# Patient Record
Sex: FEMALE | Race: WHITE | Employment: OTHER | URBAN - METROPOLITAN AREA
[De-identification: names, ages, dates, MRNs, and addresses within clinical notes are randomized per-mention and may not be internally consistent; named-entity substitution may affect disease eponyms.]

---

## 2022-03-06 ENCOUNTER — HOSPITAL ENCOUNTER (INPATIENT)
Age: 87
LOS: 2 days | Discharge: HOME | DRG: 315 | End: 2022-03-09
Attending: FAMILY MEDICINE | Admitting: FAMILY MEDICINE
Payer: MEDICARE

## 2022-03-06 DIAGNOSIS — R09.02 HYPOXIA: ICD-10-CM

## 2022-03-06 DIAGNOSIS — N39.0 URINARY TRACT INFECTION WITHOUT HEMATURIA, SITE UNSPECIFIED: ICD-10-CM

## 2022-03-06 DIAGNOSIS — A41.9 SEPTICEMIA (HCC): Primary | ICD-10-CM

## 2022-03-06 DIAGNOSIS — I31.39 PERICARDIAL EFFUSION: ICD-10-CM

## 2022-03-06 DIAGNOSIS — R77.8 ELEVATED TROPONIN: ICD-10-CM

## 2022-03-06 DIAGNOSIS — R79.89 ELEVATED BRAIN NATRIURETIC PEPTIDE (BNP) LEVEL: ICD-10-CM

## 2022-03-06 PROCEDURE — 83690 ASSAY OF LIPASE: CPT

## 2022-03-06 PROCEDURE — 83880 ASSAY OF NATRIURETIC PEPTIDE: CPT

## 2022-03-06 PROCEDURE — 84484 ASSAY OF TROPONIN QUANT: CPT

## 2022-03-06 PROCEDURE — 80053 COMPREHEN METABOLIC PANEL: CPT

## 2022-03-06 PROCEDURE — 85730 THROMBOPLASTIN TIME PARTIAL: CPT

## 2022-03-06 PROCEDURE — 83605 ASSAY OF LACTIC ACID: CPT

## 2022-03-06 PROCEDURE — 85610 PROTHROMBIN TIME: CPT

## 2022-03-06 PROCEDURE — 85025 COMPLETE CBC W/AUTO DIFF WBC: CPT

## 2022-03-06 PROCEDURE — 99285 EMERGENCY DEPT VISIT HI MDM: CPT

## 2022-03-06 PROCEDURE — 87040 BLOOD CULTURE FOR BACTERIA: CPT

## 2022-03-06 RX ORDER — ANASTROZOLE 1 MG/1
1 TABLET ORAL DAILY
COMMUNITY

## 2022-03-06 RX ORDER — FUROSEMIDE 20 MG/1
20 TABLET ORAL DAILY
COMMUNITY

## 2022-03-06 RX ORDER — LOSARTAN POTASSIUM 25 MG/1
25 TABLET ORAL DAILY
Status: ON HOLD | COMMUNITY
End: 2022-03-09 | Stop reason: HOSPADM

## 2022-03-06 RX ORDER — AMLODIPINE BESYLATE 5 MG/1
5 TABLET ORAL DAILY
COMMUNITY

## 2022-03-06 RX ORDER — CLOPIDOGREL BISULFATE 75 MG/1
75 TABLET ORAL DAILY
COMMUNITY

## 2022-03-06 RX ORDER — POTASSIUM CHLORIDE 1.5 G/1.77G
20 POWDER, FOR SOLUTION ORAL DAILY
COMMUNITY

## 2022-03-06 RX ORDER — LETROZOLE 2.5 MG/1
2.5 TABLET, FILM COATED ORAL DAILY
COMMUNITY

## 2022-03-06 RX ORDER — OMEPRAZOLE 20 MG/1
20 CAPSULE, DELAYED RELEASE ORAL DAILY
Status: ON HOLD | COMMUNITY
End: 2022-03-09 | Stop reason: HOSPADM

## 2022-03-06 RX ORDER — TRAZODONE HYDROCHLORIDE 50 MG/1
50 TABLET ORAL NIGHTLY
COMMUNITY

## 2022-03-06 RX ORDER — CYCLOBENZAPRINE HCL 5 MG
5 TABLET ORAL AS NEEDED
COMMUNITY

## 2022-03-06 RX ORDER — ATORVASTATIN CALCIUM 40 MG/1
40 TABLET, FILM COATED ORAL DAILY
COMMUNITY

## 2022-03-06 RX ORDER — PANTOPRAZOLE SODIUM 40 MG/1
40 GRANULE, DELAYED RELEASE ORAL
COMMUNITY

## 2022-03-06 RX ORDER — COLCHICINE 0.6 MG/1
0.6 TABLET ORAL AS NEEDED
COMMUNITY

## 2022-03-06 RX ORDER — MIRTAZAPINE 15 MG/1
15 TABLET, FILM COATED ORAL NIGHTLY
Status: ON HOLD | COMMUNITY
End: 2022-03-09 | Stop reason: HOSPADM

## 2022-03-06 NOTE — Clinical Note
Discharge Plan[de-identified] Other/[facility])   Telemetry/Cardiac Monitoring Required?: Yes

## 2022-03-07 ENCOUNTER — APPOINTMENT (OUTPATIENT)
Dept: CT IMAGING | Age: 87
DRG: 315 | End: 2022-03-07
Payer: MEDICARE

## 2022-03-07 ENCOUNTER — APPOINTMENT (OUTPATIENT)
Dept: GENERAL RADIOLOGY | Age: 87
DRG: 315 | End: 2022-03-07
Payer: MEDICARE

## 2022-03-07 PROBLEM — I31.39 PERICARDIAL EFFUSION: Status: ACTIVE | Noted: 2022-03-07

## 2022-03-07 LAB
ALBUMIN SERPL-MCNC: 3.7 GM/DL (ref 3.4–5)
ALP BLD-CCNC: 122 IU/L (ref 40–129)
ALT SERPL-CCNC: 10 U/L (ref 10–40)
ANION GAP SERPL CALCULATED.3IONS-SCNC: 13 MMOL/L (ref 4–16)
APTT: 39.4 SECONDS (ref 25.1–37.1)
AST SERPL-CCNC: 20 IU/L (ref 15–37)
BACTERIA: NEGATIVE /HPF
BASOPHILS ABSOLUTE: 0 K/CU MM
BASOPHILS RELATIVE PERCENT: 0.2 % (ref 0–1)
BILIRUB SERPL-MCNC: 0.6 MG/DL (ref 0–1)
BILIRUBIN URINE: NEGATIVE MG/DL
BLOOD, URINE: ABNORMAL
BUN BLDV-MCNC: 20 MG/DL (ref 6–23)
CALCIUM SERPL-MCNC: 8.9 MG/DL (ref 8.3–10.6)
CHLORIDE BLD-SCNC: 107 MMOL/L (ref 99–110)
CLARITY: ABNORMAL
CO2: 19 MMOL/L (ref 21–32)
COLOR: YELLOW
CREAT SERPL-MCNC: 1.5 MG/DL (ref 0.6–1.1)
DIFFERENTIAL TYPE: ABNORMAL
EOSINOPHILS ABSOLUTE: 0 K/CU MM
EOSINOPHILS RELATIVE PERCENT: 0.1 % (ref 0–3)
GFR AFRICAN AMERICAN: 40 ML/MIN/1.73M2
GFR NON-AFRICAN AMERICAN: 33 ML/MIN/1.73M2
GLUCOSE BLD-MCNC: 129 MG/DL (ref 70–99)
GLUCOSE, URINE: NEGATIVE MG/DL
HCT VFR BLD CALC: 30.7 % (ref 37–47)
HEMOGLOBIN: 8.6 GM/DL (ref 12.5–16)
IMMATURE NEUTROPHIL %: 0.9 % (ref 0–0.43)
INR BLD: 1.4 INDEX
KETONES, URINE: NEGATIVE MG/DL
LACTATE: 1.6 MMOL/L (ref 0.4–2)
LEUKOCYTE ESTERASE, URINE: ABNORMAL
LIPASE: 29 IU/L (ref 13–60)
LV EF: 40 %
LVEF MODALITY: NORMAL
LYMPHOCYTES ABSOLUTE: 0.6 K/CU MM
LYMPHOCYTES RELATIVE PERCENT: 3.5 % (ref 24–44)
MCH RBC QN AUTO: 25.6 PG (ref 27–31)
MCHC RBC AUTO-ENTMCNC: 28 % (ref 32–36)
MCV RBC AUTO: 91.4 FL (ref 78–100)
MONOCYTES ABSOLUTE: 1.1 K/CU MM
MONOCYTES RELATIVE PERCENT: 7.1 % (ref 0–4)
MUCUS: ABNORMAL HPF
NITRITE URINE, QUANTITATIVE: POSITIVE
NUCLEATED RBC %: 0.3 %
PDW BLD-RTO: 15.9 % (ref 11.7–14.9)
PH, URINE: 6 (ref 5–8)
PLATELET # BLD: 208 K/CU MM (ref 140–440)
PMV BLD AUTO: 9.2 FL (ref 7.5–11.1)
POTASSIUM SERPL-SCNC: 4.3 MMOL/L (ref 3.5–5.1)
PRO-BNP: 9912 PG/ML
PROTEIN UA: 100 MG/DL
PROTHROMBIN TIME: 18.1 SECONDS (ref 11.7–14.5)
RBC # BLD: 3.36 M/CU MM (ref 4.2–5.4)
RBC URINE: 56 /HPF (ref 0–6)
SARS-COV-2, NAAT: NOT DETECTED
SEGMENTED NEUTROPHILS ABSOLUTE COUNT: 14 K/CU MM
SEGMENTED NEUTROPHILS RELATIVE PERCENT: 88.2 % (ref 36–66)
SODIUM BLD-SCNC: 139 MMOL/L (ref 135–145)
SOURCE: NORMAL
SPECIFIC GRAVITY UA: 1.02 (ref 1–1.03)
TOTAL IMMATURE NEUTOROPHIL: 0.15 K/CU MM
TOTAL NUCLEATED RBC: 0 K/CU MM
TOTAL PROTEIN: 6.5 GM/DL (ref 6.4–8.2)
TROPONIN T: 0.03 NG/ML
TROPONIN T: 0.04 NG/ML
UROBILINOGEN, URINE: 0.2 MG/DL (ref 0.2–1)
WBC # BLD: 15.9 K/CU MM (ref 4–10.5)
WBC CLUMP: ABNORMAL /HPF
WBC UA: 1135 /HPF (ref 0–5)

## 2022-03-07 PROCEDURE — 93306 TTE W/DOPPLER COMPLETE: CPT

## 2022-03-07 PROCEDURE — 82805 BLOOD GASES W/O2 SATURATION: CPT

## 2022-03-07 PROCEDURE — 86900 BLOOD TYPING SEROLOGIC ABO: CPT

## 2022-03-07 PROCEDURE — 86850 RBC ANTIBODY SCREEN: CPT

## 2022-03-07 PROCEDURE — 36415 COLL VENOUS BLD VENIPUNCTURE: CPT

## 2022-03-07 PROCEDURE — 96375 TX/PRO/DX INJ NEW DRUG ADDON: CPT

## 2022-03-07 PROCEDURE — 2580000003 HC RX 258: Performed by: PHYSICIAN ASSISTANT

## 2022-03-07 PROCEDURE — 6370000000 HC RX 637 (ALT 250 FOR IP): Performed by: PHYSICIAN ASSISTANT

## 2022-03-07 PROCEDURE — 86870 RBC ANTIBODY IDENTIFICATION: CPT

## 2022-03-07 PROCEDURE — 99223 1ST HOSP IP/OBS HIGH 75: CPT | Performed by: INTERNAL MEDICINE

## 2022-03-07 PROCEDURE — 93005 ELECTROCARDIOGRAM TRACING: CPT | Performed by: PHYSICIAN ASSISTANT

## 2022-03-07 PROCEDURE — 6360000002 HC RX W HCPCS: Performed by: PHYSICIAN ASSISTANT

## 2022-03-07 PROCEDURE — 1200000000 HC SEMI PRIVATE

## 2022-03-07 PROCEDURE — 84484 ASSAY OF TROPONIN QUANT: CPT

## 2022-03-07 PROCEDURE — 71250 CT THORAX DX C-: CPT

## 2022-03-07 PROCEDURE — 6370000000 HC RX 637 (ALT 250 FOR IP): Performed by: FAMILY MEDICINE

## 2022-03-07 PROCEDURE — 94761 N-INVAS EAR/PLS OXIMETRY MLT: CPT

## 2022-03-07 PROCEDURE — 87635 SARS-COV-2 COVID-19 AMP PRB: CPT

## 2022-03-07 PROCEDURE — 87150 DNA/RNA AMPLIFIED PROBE: CPT

## 2022-03-07 PROCEDURE — 96365 THER/PROPH/DIAG IV INF INIT: CPT

## 2022-03-07 PROCEDURE — 87086 URINE CULTURE/COLONY COUNT: CPT

## 2022-03-07 PROCEDURE — 81001 URINALYSIS AUTO W/SCOPE: CPT

## 2022-03-07 PROCEDURE — 96366 THER/PROPH/DIAG IV INF ADDON: CPT

## 2022-03-07 PROCEDURE — 2580000003 HC RX 258: Performed by: FAMILY MEDICINE

## 2022-03-07 PROCEDURE — 96367 TX/PROPH/DG ADDL SEQ IV INF: CPT

## 2022-03-07 PROCEDURE — 2580000003 HC RX 258: Performed by: STUDENT IN AN ORGANIZED HEALTH CARE EDUCATION/TRAINING PROGRAM

## 2022-03-07 PROCEDURE — 87186 SC STD MICRODIL/AGAR DIL: CPT

## 2022-03-07 PROCEDURE — 2700000000 HC OXYGEN THERAPY PER DAY

## 2022-03-07 PROCEDURE — 87088 URINE BACTERIA CULTURE: CPT

## 2022-03-07 PROCEDURE — 86901 BLOOD TYPING SEROLOGIC RH(D): CPT

## 2022-03-07 PROCEDURE — 86922 COMPATIBILITY TEST ANTIGLOB: CPT

## 2022-03-07 PROCEDURE — 74176 CT ABD & PELVIS W/O CONTRAST: CPT

## 2022-03-07 PROCEDURE — 71045 X-RAY EXAM CHEST 1 VIEW: CPT

## 2022-03-07 PROCEDURE — 87040 BLOOD CULTURE FOR BACTERIA: CPT

## 2022-03-07 RX ORDER — MIRTAZAPINE 15 MG/1
15 TABLET, FILM COATED ORAL NIGHTLY
Status: DISCONTINUED | OUTPATIENT
Start: 2022-03-07 | End: 2022-03-09 | Stop reason: HOSPADM

## 2022-03-07 RX ORDER — ONDANSETRON 2 MG/ML
4 INJECTION INTRAMUSCULAR; INTRAVENOUS EVERY 30 MIN PRN
Status: DISCONTINUED | OUTPATIENT
Start: 2022-03-07 | End: 2022-03-07

## 2022-03-07 RX ORDER — SODIUM CHLORIDE 9 MG/ML
INJECTION, SOLUTION INTRAVENOUS CONTINUOUS
Status: DISCONTINUED | OUTPATIENT
Start: 2022-03-07 | End: 2022-03-07

## 2022-03-07 RX ORDER — POLYETHYLENE GLYCOL 3350 17 G/17G
17 POWDER, FOR SOLUTION ORAL DAILY PRN
Status: DISCONTINUED | OUTPATIENT
Start: 2022-03-07 | End: 2022-03-09 | Stop reason: HOSPADM

## 2022-03-07 RX ORDER — SODIUM CHLORIDE 0.9 % (FLUSH) 0.9 %
5-40 SYRINGE (ML) INJECTION EVERY 12 HOURS SCHEDULED
Status: DISCONTINUED | OUTPATIENT
Start: 2022-03-07 | End: 2022-03-09 | Stop reason: HOSPADM

## 2022-03-07 RX ORDER — 0.9 % SODIUM CHLORIDE 0.9 %
1000 INTRAVENOUS SOLUTION INTRAVENOUS ONCE
Status: COMPLETED | OUTPATIENT
Start: 2022-03-07 | End: 2022-03-07

## 2022-03-07 RX ORDER — PROMETHAZINE HYDROCHLORIDE 25 MG/1
12.5 TABLET ORAL EVERY 6 HOURS PRN
Status: DISCONTINUED | OUTPATIENT
Start: 2022-03-07 | End: 2022-03-09 | Stop reason: HOSPADM

## 2022-03-07 RX ORDER — SODIUM CHLORIDE 9 MG/ML
25 INJECTION, SOLUTION INTRAVENOUS PRN
Status: DISCONTINUED | OUTPATIENT
Start: 2022-03-07 | End: 2022-03-09 | Stop reason: HOSPADM

## 2022-03-07 RX ORDER — TRAZODONE HYDROCHLORIDE 50 MG/1
50 TABLET ORAL NIGHTLY PRN
Status: DISCONTINUED | OUTPATIENT
Start: 2022-03-07 | End: 2022-03-09 | Stop reason: HOSPADM

## 2022-03-07 RX ORDER — FENTANYL CITRATE 50 UG/ML
25 INJECTION, SOLUTION INTRAMUSCULAR; INTRAVENOUS ONCE
Status: COMPLETED | OUTPATIENT
Start: 2022-03-07 | End: 2022-03-07

## 2022-03-07 RX ORDER — PANTOPRAZOLE SODIUM 40 MG/1
40 TABLET, DELAYED RELEASE ORAL
Status: DISCONTINUED | OUTPATIENT
Start: 2022-03-07 | End: 2022-03-09 | Stop reason: HOSPADM

## 2022-03-07 RX ORDER — LETROZOLE 2.5 MG/1
2.5 TABLET, FILM COATED ORAL DAILY
Status: DISCONTINUED | OUTPATIENT
Start: 2022-03-07 | End: 2022-03-09 | Stop reason: HOSPADM

## 2022-03-07 RX ORDER — SODIUM CHLORIDE 9 MG/ML
INJECTION, SOLUTION INTRAVENOUS CONTINUOUS
Status: DISPENSED | OUTPATIENT
Start: 2022-03-07 | End: 2022-03-07

## 2022-03-07 RX ORDER — 0.9 % SODIUM CHLORIDE 0.9 %
500 INTRAVENOUS SOLUTION INTRAVENOUS ONCE
Status: COMPLETED | OUTPATIENT
Start: 2022-03-07 | End: 2022-03-07

## 2022-03-07 RX ORDER — ACETAMINOPHEN 650 MG/1
650 SUPPOSITORY RECTAL EVERY 6 HOURS PRN
Status: DISCONTINUED | OUTPATIENT
Start: 2022-03-07 | End: 2022-03-09 | Stop reason: HOSPADM

## 2022-03-07 RX ORDER — ACETAMINOPHEN 500 MG
1000 TABLET ORAL ONCE
Status: COMPLETED | OUTPATIENT
Start: 2022-03-07 | End: 2022-03-07

## 2022-03-07 RX ORDER — SODIUM CHLORIDE 0.9 % (FLUSH) 0.9 %
5-40 SYRINGE (ML) INJECTION PRN
Status: DISCONTINUED | OUTPATIENT
Start: 2022-03-07 | End: 2022-03-09 | Stop reason: HOSPADM

## 2022-03-07 RX ORDER — ATORVASTATIN CALCIUM 40 MG/1
40 TABLET, FILM COATED ORAL NIGHTLY
Status: DISCONTINUED | OUTPATIENT
Start: 2022-03-07 | End: 2022-03-09 | Stop reason: HOSPADM

## 2022-03-07 RX ORDER — ACETAMINOPHEN 325 MG/1
650 TABLET ORAL EVERY 6 HOURS PRN
Status: DISCONTINUED | OUTPATIENT
Start: 2022-03-07 | End: 2022-03-09 | Stop reason: HOSPADM

## 2022-03-07 RX ADMIN — AZITHROMYCIN MONOHYDRATE 500 MG: 500 INJECTION, POWDER, LYOPHILIZED, FOR SOLUTION INTRAVENOUS at 02:47

## 2022-03-07 RX ADMIN — SODIUM CHLORIDE: 9 INJECTION, SOLUTION INTRAVENOUS at 10:35

## 2022-03-07 RX ADMIN — SODIUM CHLORIDE, PRESERVATIVE FREE 10 ML: 5 INJECTION INTRAVENOUS at 07:46

## 2022-03-07 RX ADMIN — MIRTAZAPINE 15 MG: 15 TABLET, FILM COATED ORAL at 20:15

## 2022-03-07 RX ADMIN — SODIUM CHLORIDE 500 ML: 9 INJECTION, SOLUTION INTRAVENOUS at 01:51

## 2022-03-07 RX ADMIN — PANTOPRAZOLE SODIUM 40 MG: 40 TABLET, DELAYED RELEASE ORAL at 10:35

## 2022-03-07 RX ADMIN — ONDANSETRON 4 MG: 2 INJECTION INTRAMUSCULAR; INTRAVENOUS at 01:30

## 2022-03-07 RX ADMIN — CEFTRIAXONE 1000 MG: 1 INJECTION, POWDER, FOR SOLUTION INTRAMUSCULAR; INTRAVENOUS at 02:15

## 2022-03-07 RX ADMIN — ACETAMINOPHEN 1000 MG: 500 TABLET ORAL at 02:14

## 2022-03-07 RX ADMIN — PROMETHAZINE HYDROCHLORIDE 12.5 MG: 25 TABLET ORAL at 10:35

## 2022-03-07 RX ADMIN — ACETAMINOPHEN 650 MG: 325 TABLET ORAL at 10:35

## 2022-03-07 RX ADMIN — LETROZOLE 2.5 MG: 2.5 TABLET ORAL at 10:35

## 2022-03-07 RX ADMIN — FENTANYL CITRATE 25 MCG: 50 INJECTION, SOLUTION INTRAMUSCULAR; INTRAVENOUS at 02:14

## 2022-03-07 RX ADMIN — ATORVASTATIN CALCIUM 40 MG: 40 TABLET, FILM COATED ORAL at 20:15

## 2022-03-07 RX ADMIN — SODIUM CHLORIDE: 9 INJECTION, SOLUTION INTRAVENOUS at 02:14

## 2022-03-07 RX ADMIN — SODIUM CHLORIDE 1000 ML: 9 INJECTION, SOLUTION INTRAVENOUS at 04:17

## 2022-03-07 ASSESSMENT — ENCOUNTER SYMPTOMS
VOMITING: 1
DIARRHEA: 0
CHEST TIGHTNESS: 1
ABDOMINAL PAIN: 0
ALLERGIC/IMMUNOLOGIC NEGATIVE: 1
ABDOMINAL DISTENTION: 0
NAUSEA: 1
SHORTNESS OF BREATH: 1

## 2022-03-07 ASSESSMENT — PAIN SCALES - GENERAL
PAINLEVEL_OUTOF10: 0
PAINLEVEL_OUTOF10: 10
PAINLEVEL_OUTOF10: 6

## 2022-03-07 NOTE — CARE COORDINATION
Reviewed chart and spoke with pt about discharge needs/plan. Pt lives in [place] with her sister italo. PTA she did not use any DME, she was independent with bathing and dressing  But  Sister in-law does everything else for her . She has a PCP and insurance that covers medication. Her daughter lives in [facility] and pt was here to see a specialist when she go sick. Pt plans on discharging to daughter's home until she can return to her own home with sister in-law. Pt declined HC at this time. CM will continue to follow for needs.

## 2022-03-07 NOTE — ED NOTES
Per provider additional IV access needs to be placed s/t abnormal myocardium findings.       Kelly Barragan, GLORIA  03/07/22 7044

## 2022-03-07 NOTE — ED TRIAGE NOTES
Pt brought in by EMS due to nausea and vomiting for the last hour, Ems gave 4 of zofran on the medic. Upon arrival patient is also complaining of left hip pain.

## 2022-03-07 NOTE — ED PROVIDER NOTES
Triage Chief Complaint:   Nausea, Nausea & Vomiting (for the last hour), and Hip Pain (left sided hip pain started 2 hours ago, took tylenol at home. )    [place]:  Today in the ED I had the pleasure of caring for Benjamin Mitchell who is a 80 y.o. female that presents today to the emergency department. Complaint of nausea vomiting. X1 hour. No soda chest pain. She does endorse mild shortness of breath while sitting down. She has had sided hip pain. Without trauma x1 day. She states it began when she was slouching in her chair. She would to pull her pants up and she shifted. She denies any associated paresthesias lower extremity weakness. She is accompanied by family member. Patient denies any headache. No sensation of palpitations. She endorses subjective fevers and chills. No paresthesias associated with the hip pain. No chest pain. No shortness of breath no history of pulmonary embolism. She did have a pacemaker placed roughly 1 month ago. Had a remote hospital.  Denies lightheadedness or dizziness. No headache. No sensation of palpitations. No extremity weakness. She is on blood thinners including Plavix and Eliquis. Denies any bleeding. Does state that she has \"blood on her heart that she was supposed to get drained (outpatient) in the near future\". She states her cardiologist is aware of said \"blood around (my) heart\" (pericardial effusion?)    ROS:  REVIEW OF SYSTEMS    At least 10 systems reviewed      All other review of systems are negative  See HPI and nursing notes for additional information       No past medical history on file. No past surgical history on file. No family history on file.   Social History     Socioeconomic History    Marital status: Single     Spouse name: Not on file    Number of children: Not on file    Years of education: Not on file    Highest education level: Not on file   Occupational History    Not on file   Tobacco Use    Smoking status: Not on file    Smokeless tobacco: Not on file   Substance and Sexual Activity    Alcohol use: Not on file    Drug use: Not on file    Sexual activity: Not on file   Other Topics Concern    Not on file   Social History Narrative    Not on file     Social Determinants of Health     Financial Resource Strain:     Difficulty of Paying Living Expenses: Not on file   Food Insecurity:     Worried About Running Out of Food in the Last Year: Not on file    [place] of Food in the Last Year: Not on file   Transportation Needs:     Lack of Transportation (Medical): Not on file    Lack of Transportation (Non-Medical):  Not on file   Physical Activity:     Days of Exercise per Week: Not on file    Minutes of Exercise per Session: Not on file   Stress:     Feeling of Stress : Not on file   Social Connections:     Frequency of Communication with Friends and Family: Not on file    Frequency of Social Gatherings with Friends and Family: Not on file    Attends Scientology Services: Not on file    Active Member of [address] [place] [address] or Organizations: Not on file    Attends Club or Organization Meetings: Not on file    Marital Status: Not on file   Intimate Partner Violence:     Fear of Current or Ex-Partner: Not on file    Emotionally Abused: Not on file    Physically Abused: Not on file    Sexually Abused: Not on file   Housing Stability:     Unable to Pay for Housing in the Last Year: Not on file    Number of [place] in the Last Year: Not on file    Unstable Housing in the Last Year: Not on file     Current Facility-Administered Medications   Medication Dose Route Frequency Provider Last Rate Last Admin    ondansetron (ZOFRAN) injection 4 mg  4 mg IntraVENous Q30 Min PRN Todd Odonnell, PA-C   4 mg at 03/07/22 0130    0.9 % sodium chloride infusion   IntraVENous Continuous Carrolyn Blas, PA-C 100 mL/hr at 03/07/22 0214 New Bag at 03/07/22 0214     Current Outpatient Medications   Medication Sig Dispense Refill    traZODone (DESYREL) 50 MG tablet Take 50 mg by mouth nightly      omeprazole (PRILOSEC) 20 MG delayed release capsule Take 20 mg by mouth daily      amLODIPine (NORVASC) 5 MG tablet Take 5 mg by mouth daily      mirtazapine (REMERON) 15 MG tablet Take 15 mg by mouth nightly      letrozole (FEMARA) 2.5 MG tablet Take 2.5 mg by mouth daily      cyclobenzaprine (FLEXERIL) 5 MG tablet Take 5 mg by mouth as needed for Muscle spasms      colchicine (COLCRYS) 0.6 MG tablet Take 0.6 mg by mouth as needed for Pain      anastrozole (ARIMIDEX) 1 MG tablet Take 1 mg by mouth daily      potassium chloride (KLOR-CON) 20 MEQ packet Take 20 mEq by mouth daily      clopidogrel (PLAVIX) 75 MG tablet Take 75 mg by mouth daily      pantoprazole sodium (PROTONIX) 40 MG PACK packet Take 40 mg by mouth every morning (before breakfast)      losartan (COZAAR) 25 MG tablet Take 25 mg by mouth daily      furosemide (LASIX) 20 MG tablet Take 20 mg by mouth daily      atorvastatin (LIPITOR) 40 MG tablet Take 40 mg by mouth daily       No Known Allergies    Nursing Notes Reviewed    Physical Exam:  ED Triage Vitals   Enc Vitals Group      BP 03/06/22 2247 (!) 132/105      Pulse 03/06/22 2251 92      Resp 03/06/22 2251 17      Temp 03/06/22 2247 99 °F (37.2 °C)      Temp Source 03/06/22 2247 Oral      SpO2 03/06/22 2251 90 %      Weight 03/06/22 2303 95 lb (43.1 kg)      Height 03/06/22 2303 4' 6\" (1.372 m)      Head Circumference --       Peak Flow --       Pain Score --       Pain Loc --       Pain Edu? --       Excl. in [address]? --      General :Patient is awake alert oriented person place and time no acute distress nontoxic appearing  HEENT: Pupils are equally round and reactive to light extraocular motors are intact conjunctivae clear sclerae white there is no injection no icterus. Nose without any rhinorrhea or epistaxis. Oral mucosa is moist no exudate buccal mucosa shows no ulcerations.  Uvula is midline    Neck: Neck is supple full range of motion trachea midline thyroid nonpalpable  Cardiac: Heart regular rate rhythm no murmurs rubs clicks or gallops  Lungs: Lungs are clear to auscultation there is no wheezing rhonchi or rales. There is no use of accessory muscles no nasal flaring identified. Chest wall: There is no tenderness to palpation over the chest wall or over ribs  Abdomen: Abdomen is soft nontender nondistended. There is no firm or pulsatile masses no rebound rigidity or guarding negative Boggs's negative McBurney, no peritoneal signs  Suprapubic:  there is no tenderness to palpation over the external bladder   Musculoskeletal: 5 out of 5 strength in all 4 extremities full flexion extension abduction and adduction supination pronation of all extremities and all digits. No obvious muscle atrophy is noted. No focal muscle deficits are appreciated  BACK: There is not thoracic or lumbar midline tenderness to palpation or step-offs. Paraspinal tenerness to palpation is  present in the lumbar region. No overlying rashes. LE strength is 5/5. LE light touch is intact. LE DTR's are 2+ in the patellas and achilles. Straight leg test is not present on the bilateral.    Dermatology: Skin is warm and dry there is no obvious abscesses lacerations or lesions noted  Psych: Mentation is grossly normal cognition is grossly normal. Affect is appropriate  Neuro: Motor intact sensory intact cranial nerves II through XII are intact level of consciousness is normal cerebellar function is normal reflexes are grossly normal. No evidence of incontinence or loss of bowel or bladder no saddle anesthesia noted Lymphatic: There is no submandibular or cervical adenopathy appreciated.         I have reviewed and interpreted all of the currently available lab results from this visit (if applicable):  Results for orders placed or performed during the hospital encounter of 03/06/22   COVID-19, Rapid    Specimen: Nasopharyngeal   Result Value Ref Range    Source THROAT     SARS-CoV-2, NAAT NOT DETECTED NOT DETECTED   CBC with Auto Differential   Result Value Ref Range    WBC 15.9 (H) 4.0 - 10.5 K/CU MM    RBC 3.36 (L) 4.2 - 5.4 M/CU MM    Hemoglobin 8.6 (L) 12.5 - 16.0 GM/DL    Hematocrit 30.7 (L) 37 - 47 %    MCV 91.4 78 - 100 FL    MCH 25.6 (L) 27 - 31 PG    MCHC 28.0 (L) 32.0 - 36.0 %    RDW 15.9 (H) 11.7 - 14.9 %    Platelets 137 242 - 275 K/CU MM    MPV 9.2 7.5 - 11.1 FL    Differential Type AUTOMATED DIFFERENTIAL     Segs Relative 88.2 (H) 36 - 66 %    Lymphocytes % 3.5 (L) 24 - 44 %    Monocytes % 7.1 (H) 0 - 4 %    Eosinophils % 0.1 0 - 3 %    Basophils % 0.2 0 - 1 %    Segs Absolute 14.0 K/CU MM    Lymphocytes Absolute 0.6 K/CU MM    Monocytes Absolute 1.1 K/CU MM    Eosinophils Absolute 0.0 K/CU MM    Basophils Absolute 0.0 K/CU MM    Nucleated RBC % 0.3 %    Total Nucleated RBC 0.0 K/CU MM    Total Immature Neutrophil 0.15 K/CU MM    Immature Neutrophil % 0.9 (H) 0 - 0.43 %   Comprehensive Metabolic Panel   Result Value Ref Range    Sodium 139 135 - 145 MMOL/L    Potassium 4.3 3.5 - 5.1 MMOL/L    Chloride 107 99 - 110 mMol/L    CO2 19 (L) 21 - 32 MMOL/L    BUN 20 6 - 23 MG/DL    CREATININE 1.5 (H) 0.6 - 1.1 MG/DL    Glucose 129 (H) 70 - 99 MG/DL    Calcium 8.9 8.3 - 10.6 MG/DL    Albumin 3.7 3.4 - 5.0 GM/DL    Total Protein 6.5 6.4 - 8.2 GM/DL    Total Bilirubin 0.6 0.0 - 1.0 MG/DL    ALT 10 10 - 40 U/L    AST 20 15 - 37 IU/L    Alkaline Phosphatase 122 40 - 129 IU/L    GFR Non- 33 (L) >60 mL/min/1.73m2    GFR  40 (L) >60 mL/min/1.73m2    Anion Gap 13 4 - 16   Troponin   Result Value Ref Range    Troponin T 0.034 (H) <0.01 NG/ML   Lipase   Result Value Ref Range    Lipase 29 13 - 60 IU/L   Brain Natriuretic Peptide   Result Value Ref Range    Pro-BNP 9,912 (H) <300 PG/ML   Lactic Acid   Result Value Ref Range    Lactate 1.6 0.4 - 2.0 mMOL/L   Urinalysis   Result Value Ref Range    Color, UA YELLOW YELLOW    Clarity, UA CLOUDY (A) CLEAR    Glucose, Urine NEGATIVE NEGATIVE MG/DL    Bilirubin Urine NEGATIVE NEGATIVE MG/DL    Ketones, Urine NEGATIVE NEGATIVE MG/DL    Specific Gravity, UA 1.020 1.001 - 1.035    Blood, Urine LARGE AMOUNT/NUMBER OF (A) NEGATIVE    pH, Urine 6.0 5.0 - 8.0    Protein,  (A) NEGATIVE MG/DL    Urobilinogen, Urine 0.2 0.2 - 1.0 MG/DL    Nitrite Urine, Quantitative POSITIVE (A) NEGATIVE    Leukocyte Esterase, Urine MODERATE (A) NEGATIVE    RBC, UA 56 (H) 0 - 6 /HPF    WBC, UA 1135 (H) 0 - 5 /HPF    Bacteria, UA NEGATIVE NEGATIVE /HPF    WBC Clumps, UA MANY /HPF    Mucus, UA RARE (A) NEGATIVE HPF   Protime/INR & PTT   Result Value Ref Range    Protime 18.1 (H) 11.7 - 14.5 SECONDS    INR 1.40 INDEX    aPTT 39.4 (H) 25.1 - 37.1 SECONDS   EKG 12 Lead   Result Value Ref Range    Ventricular Rate 89 BPM    Atrial Rate 89 BPM    QRS Duration 124 ms    Q-T Interval 414 ms    QTc Calculation (Bazett) 503 ms    R Axis -74 degrees    T Axis 104 degrees    Diagnosis       Undetermined rhythm  Left axis deviation  Right bundle branch block  Anteroseptal infarct , age undetermined  Abnormal ECG  No previous ECGs available        Radiographs (if obtained):  [] The following radiograph was interpreted by myself in the absence of a radiologist:   [] Radiologist's Report Reviewed:  CT CHEST WO CONTRAST   Preliminary Result   1. Cardiomegaly is seen. There is a mild to moderate-sized pericardial   effusion with increased density of the fluid, concerning for hemopericardium. No definite evidence to suggest cardiac tamponade at this time. Correlate   with echocardiogram.   2. Small bilateral pleural effusions with findings of pulmonary edema. Findings were discussed with Genie Montes at 3:34 am on 3/7/2022. CT ABDOMEN PELVIS WO CONTRAST Additional Contrast? None   Preliminary Result   1. No acute findings within the abdomen and pelvis.    2. Cardiomegaly with pericardial effusion and small bilateral pleural effusions. 3. Colonic diverticulosis. 4. Calcified fibroid uterus. XR CHEST PORTABLE   Final Result   Mild interstitial prominence is identified though favored to be chronic in   nature. Focal opacification at the right costophrenic angle may represent a   trace pleural effusion, airspace disease or focal pleuroparenchymal scarring. Cardiomegaly. Degenerative changes in the left shoulder and spine, with a dextroscoliotic   deformity. EKG (if obtained):   Please See Note of attending physician for EKG interpretation. Chart review shows recent radiograph(s):  No results found. MDM:   Presents today to the emergency department. Complaining of nausea, vomiting. Hip pain. Exam of the hip is unremarkable. Belly exam is unremarkable. No evidence of acute abdominal processes. Abdominal exam is nonsurgical/nonacute/not emergent. Blood work does reveal a leukocytosis of nearly 16,000. Chest x-ray reveals no evidence of pneumonia. Physical examination reveals no overt infectious processes. Patient does have a nitrite positive urinary tract infection. Fever of 102.9. Antipyretics are provided. IV antibiotics Rocephin and azithromycin are also provided. For this infectious process. Metabolic panel reveals a creatinine of 1.5 (there is no reference labs as patient is never been here to our facility) patient's troponin is noted to be mildly elevated 0.03. Patient is having 0 chest pain 0 shortness of breath. Patient is a bit hypoxic noted to be 90% on room air is provided with 2 L of oxygen via nasal cannula. Is in 0 respiratory distress. Imaging of the x-ray did reveal pleural effusions pulmonary vascular congestion as well as pericardial effusion.   Followed up with a CT scan which also revealed mild to moderate pericardial effusion with increased density of the fluid concerning for hemopercardium I did speak to our on-call cardiothoracic surgeon Dr. Daphne Pierce regarding these findings. We discussed patient's recent pacemaker placement (1 month ago) the fact the patient is on Plavix as well as Eliquis. He had advised further fluid resuscitation so another liter of fluid is provided. Patient is already gotten 500 cc of IV fluid as well as a saline infusion has been running for several hours. Patient's blood pressure is currently trending down. Patient has a mean arterial pressure at this time of 70. Initial blood pressure was 127/48. Patient's BNP is a bit elevated at 9000. However given downtrending blood pressure. Fluid resuscitation is emergently necessary. I then called on-call cardiologist Dr. Kulwant Bellamy spoke to him regarding patient. And requested echocardiogram.  Dr. Kulwant Bellamy obliged. Advised admit patient to medicine. And he will follow echocardiogram. (Per hospital policy stat echocardiograms must be interpreted by cardiologist). stat echocardiogram was ordered by myself. Fluid resuscitation is successful. Patient's blood pressure steadily rising. Heart rate has been consistently in the high 60s to mid 70s beats per minute throughout her stay here in the emergency department. Low suspicion of tamponade clinically. This supported by normotension with fluid resuscitation, normaocardia, ekg with normal voltage. Once again patient is not subjectively experiencing any chest pain or shortness of breath. I do believe patient's ephemeral downtrending blood pressure secondary to urinary tract infection and sepsis. On-call physician Was consulted regarding patient. After thorough discussion regarding patient's history, physical exam.  laboratory values, radiographic evidence (if applicable  theymyself as well as my attending physician agreed Given the patient's presenting concerns, medical history and clinical findings, the patient will be admitted at this time to undergo further evaluation and disposition. . During patient's entire stay in the ED patient remained stable and comfortable. Analgesia is well-controlled. Patient will be admitted for all information regarding ongoing management and care of patient please see note of Admitting physician. All EKG interpretations are performed by Attending physician      Total critical care time today provided was at least  50  minutes. This excludes seperately billable procedure. Critical care time provided for SEPSIS And Pericardial effusion that required close evaluation and/or intervention with concern for patient decompensation. BP (!) 127/48   Pulse 78   Temp 102.9 °F (39.4 °C) (Oral)   Resp 23   Ht 4' 6\" (1.372 m)   Wt 95 lb (43.1 kg)   SpO2 98%   BMI 22.91 kg/m²       Clinical Impression:  1. Septicemia ([address]    2. Urinary tract infection without hematuria, site unspecified    3. Elevated troponin    4. Elevated brain natriuretic peptide (BNP) level    5. Hypoxia    6. Pericardial effusion        Disposition referral (if applicable):  No follow-up provider specified. Disposition medications (if applicable):  New Prescriptions    No medications on file         Comment: Please note this report has been produced using speech recognition software and may contain errors related to that system including errors in grammar, punctuation, and spelling, as well as words and phrases that may be inappropriate. If there are any questions or concerns please feel free to contact the dictating provider for clarification.       Vanna Lilian, [address] [place] [address]  03/07/22 0420

## 2022-03-07 NOTE — PROGRESS NOTES
Call that patient has 3/4 blood cultures positive for E. coli. Patient had fever in the emergency room but was not tachycardic. Mild leukocytosis. Respiratory rate was overall stable and lactate was normal.  Clinical picture was not overall consistent with sepsis on admission but it is possible with bacteremia. Continue IV Rocephin. Will adjust antimicrobial regimen accordingly. 145.9

## 2022-03-07 NOTE — CONSULTS
CARDIOLOGY CONSULT NOTE   Reason for consultation: pericardial effusion    Referring physician:  Codie Alberts MD     Primary care physician:   Codie Alberts MD     Dear    Codie Alberts MD   Thanks for the consult. History of present illness:Luma is a 80 y. 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 Chief Complaint   Patient presents with    Nausea    Nausea & Vomiting     for the last hour    Hip Pain     left sided hip pain started 2 hours ago, took tylenol at home. Blood pressure, cholesterol, blood glucose and weight are well controlled. Past medical history:    has a past medical history of Breast cancer ([facility] [address] Cerebrovascular accident (CVA) due to vascular occlusion ([facility] [address] CKD (chronic kidney disease), Coronary artery disease involving native coronary artery without angina pectoris, Hypertension, and Multiple myeloma not having achieved remission ([facility] [address] Past surgical history:   has a past surgical history that includes thrombectomy and Coronary angioplasty with stent. Social History:   reports that she has never smoked. She does not have any smokeless tobacco history on file. She reports current alcohol use. She reports that she does not use drugs.   Family history:   no family history of CAD, STROKE of DM    No Known Allergies    sodium chloride flush 0.9 % injection 5-40 mL, 2 times per day  sodium chloride flush 0.9 % injection 5-40 mL, PRN  0.9 % sodium chloride infusion, PRN  acetaminophen (TYLENOL) tablet 650 mg, Q6H PRN   Or  acetaminophen (TYLENOL) suppository 650 mg, Q6H PRN  polyethylene glycol (GLYCOLAX) packet 17 g, Daily PRN  [START ON 3/8/2022] cefTRIAXone (ROCEPHIN) 1000 mg IVPB in 50 mL D5W minibag, Q24H  promethazine (PHENERGAN) tablet 12.5 mg, Q6H PRN  atorvastatin (LIPITOR) tablet 40 mg, Nightly  letrozole (FEMARA) tablet 2.5 mg, Daily  mirtazapine (REMERON) tablet 15 mg, Nightly  pantoprazole (PROTONIX) tablet 40 mg, QAM AC  traZODone (DESYREL) tablet 50 mg, Nightly PRN  0.9 % sodium chloride infusion, Continuous      Current Facility-Administered Medications   Medication Dose Route Frequency Provider Last Rate Last Admin    sodium chloride flush 0.9 % injection 5-40 mL  5-40 mL IntraVENous 2 times per day Denita Ruvalcaba MD   10 mL at 03/07/22 0746    sodium chloride flush 0.9 % injection 5-40 mL  5-40 mL IntraVENous PRN Denita Ruvalcaba MD        0.9 % sodium chloride infusion  25 mL IntraVENous PRN Denita Ruvalcaba MD        acetaminophen (TYLENOL) tablet 650 mg  650 mg Oral Q6H PRN Denita Ruvalcaba MD   650 mg at 03/07/22 1035    Or    acetaminophen (TYLENOL) suppository 650 mg  650 mg Rectal Q6H PRN Denita Ruvalcaba MD        polyethylene glycol [facility]) packet 17 g  17 g Oral Daily PRN Denita Ruvalcaba MD        [START ON 3/8/2022] cefTRIAXone (ROCEPHIN) 1000 mg IVPB in 50 mL D5W minibag  1,000 mg IntraVENous Q24H Denita Ruvalcaba MD        promethazine (PHENERGAN) tablet 12.5 mg  12.5 mg Oral Q6H PRN Denita Ruvalcaba MD   12.5 mg at 03/07/22 1035    atorvastatin (LIPITOR) tablet 40 mg  40 mg Oral Nightly Denita Ruvalcaba MD        letrozole [facility]) tablet 2.5 mg  2.5 mg Oral Daily Denita Ruvalcaba MD   2.5 mg at 03/07/22 1035    mirtazapine (REMERON) tablet 15 mg  15 mg Oral Nightly Denita Ruvalcaba MD        pantoprazole (PROTONIX) tablet 40 mg  40 mg Oral QAM  Denita Ruvalcaba MD   40 mg at 03/07/22 1035    traZODone (DESYREL) tablet 50 mg  50 mg Oral Nightly PRN Tom Kirby MD        0.9 % sodium chloride infusion   IntraVENous Continuous Tom Kirby MD 50 mL/hr at 03/07/22 1035 New Bag at 03/07/22 1035     Review of Systems:   · Constitutional: No Fever or Weight Loss   · Eyes: No Decreased Vision  · ENT: No Headaches, Hearing Loss or Vertigo  · Cardiovascular: No chest pain, dyspnea on exertion, palpitations or loss of consciousness  · Respiratory: No cough or wheezing    · Gastrointestinal: No abdominal pain, appetite loss, blood in stools, constipation, diarrhea or heartburn  · Genitourinary: No dysuria, trouble voiding, or hematuria  · Musculoskeletal:  No gait disturbance, weakness or joint complaints  · Integumentary: No rash or pruritis  · Neurological: No TIA or stroke symptoms  · Psychiatric: No anxiety or depression  · Endocrine: No malaise, fatigue or temperature intolerance  · Hematologic/Lymphatic: No bleeding problems, blood clots or swollen lymph nodes  · Allergic/Immunologic: No nasal congestion or hives  All systems negative except as marked. ·   ·      Physical Examination:    Vitals:    03/07/22 0825   BP: 101/86   Pulse: 72   Resp: 20   Temp: 98.1 °F (36.7 °C)   SpO2: 94%      Wt Readings from Last 3 Encounters:   03/06/22 95 lb (43.1 kg)     Body mass index is 22.91 kg/m². General Appearance:  No distress, conversant    Constitutional:  Well developed, Well nourished, No acute distress, Non-toxic appearance. HENT:  Normocephalic, Atraumatic, Bilateral external ears normal, Oropharynx moist, No oral exudates, Nose normal. Neck- Normal range of motion, No tenderness, Supple, No stridor,no apical-carotid delay, no carotid bruit  Eyes:  PERRL, EOMI, Conjunctiva normal, No discharge. Respiratory:  Normal breath sounds, No respiratory distress, No wheezing, No chest tenderness. ,no use of accessory muscles, diaphragm movement is normal  Cardiovascular: (PMI) apex non displaced,no lifts no thrills, no s3,no s4, Normal heart rate, Normal rhythm, No murmurs, No rubs, No gallops. Carotid arteries pulse and amplitude are normal no bruit, no abdominal bruit noted ( normal abdominal aorta ausculation), femoral arteries pulse and amplitude are normal no bruit, pedal pulses are normal  GI:  Bowel sounds normal, Soft, No tenderness, No masses, No pulsatile masses, no hepatosplenomegally, no bruits  : External genitalia appear normal, No masses or lesions. No discharge. No CVA tenderness. Musculoskeletal:  Intact distal pulses, No edema, No tenderness, No cyanosis, No clubbing. Good range of motion in all major joints. No tenderness to palpation or major deformities noted. Back- No tenderness. Integument:  Warm, Dry, No erythema, No rash. Skin: no rash, no ulcers  Lymphatic:  No lymphadenopathy noted. Neurologic:  Alert & oriented x 3, Normal motor function, Normal sensory function, No focal deficits noted. Psychiatric:  Affect normal, Judgment normal, Mood normal.   Lab Review   Recent Labs     03/06/22  2350   WBC 15.9*   HGB 8.6*   HCT 30.7*         Recent Labs     03/06/22  2350      K 4.3      CO2 19*   BUN 20   CREATININE 1.5*     Recent Labs     03/06/22  2350   AST 20   ALT 10   BILITOT 0.6   ALKPHOS 122     No results for input(s): TROPONINI in the last 72 hours. No results found for: BNP  Lab Results   Component Value Date    INR 1.40 03/06/2022    PROTIME 18.1 (H) 03/06/2022         EKG:paced    Chest Xray:  Mild interstitial prominence is identified though favored to be chronic in   nature.  Focal opacification at the right costophrenic angle may represent a   trace pleural effusion, airspace disease or focal pleuroparenchymal scarring.             ECHO:normal lvef MILD pericardial effusion  Labs, echo, meds reviewed  Assessment: 80 y. o.year old with PMH of  has a past medical history of Breast cancer ([address] Cerebrovascular accident (CVA) due to vascular occlusion ([address] CKD (chronic kidney disease), Coronary artery disease involving native coronary artery without angina pectoris, Hypertension, and Multiple myeloma not having achieved remission ([facility] [address] Recommendations:    1. Pericardial effusion\" echo done and reviewed,pericardial effusion is mild,  No tamponade, it must be post PPM insertion and use of plavix, will not recommend pericardiocentesis of pericardial window, ok to continue plavix,   2. H/o CAD AND PCI at outside hospital, continue medical management as above,not on BB  3. HTN:continue norvasc and losartan  4. Elevated creatinine:\" as per medicine, if its acute, will recommend to hold losartan, may use IVF, echo lVEF IS NORMAL  5. ANEMIA;' AS PER MEDICINE  6. H/O CVA: stable  7. Dyslipidemia: continue statins  All labs, medications and tests reviewed, continue all other medications of all above medical condition listed as is.          Hamida Quiñones MD, 3/7/2022 12:24 PM

## 2022-03-07 NOTE — H&P
V2.0  History and Physical      Name:  Aryan Lentz /Age/Sex: 1934  (80 y.o. female)   MRN & CSN:  [id number] & [id number] Encounter Date/Time: 3/7/2022 7:08 AM EST   Location:  ED[facility]/ED-30 PCP: No primary care provider on file. Hospital Day: 2    Assessment and Plan:   Aryan Lentz is a 80 y.o. female with a pmh of acute CVA in 2021, HTN, breast cancer, PPM,  CAD, CHF, multiple myeloma and CKD who presents with nausea and vomiting. Labs showed Cr 1.5, glu 129, BNP 9912, trop 0.034, WBC 15.9, hgb 8.6, INR 1.4. CT chest showed moderate pericardial effusion concerning for hemopericardium and small BL effusions. CT A/P with no acute findings. Admitted for  Pericardial effusion    Hospital Problems           Last Modified POA    * (Principal) Pericardial effusion 3/7/2022 Yes          1. Pericardial effusion: Moderate size seen on CT chest.  No evidence of tamponade thus far. Possible hemopericardium. Patient cindy any chest pain but has had some dyspnea. Echocardiogram from [address] [place] [address] on 2021 showed preserved EF with no evidence of effusion at the time. Repeating echocardiogram.  Cardiology and cardiothoracic's to consult. 2. Acute UTI with possible Sepsis: Presented with nausea/vomiting. Difficult to assess symptoms as patient is a poor historian. Febrile in the emergency room. Leukocytosis. UA consistent with infection. Normal lactate, No documented hypoxia. No tachycardia and RR overall stable. Checking urine/blood cultures. Continue IV Rocephin started in the emergency room and will adjust accordingly. 3. Acute anemia: BL hgb ~9 recently. Possible blood loss with potential hemopericardium as above. Patient on ASA/Plavix for history of stroke and CAD. Holding antiplatelet medications at this juncture and will restart as able. Cardiology to consult. No indication for transfusion at this juncture but will monitor and transfuse as needed for hemoglobin less than 7 or symptomatic.   4. CKD stage III: Baseline creatinine around 1.4-1.5 and stable on admission. Will monitor and avoid nephrotoxic medications. Patient is a history of multiple myeloma. 5. Multiple myeloma: Currently not in remission per patient and follows with oncology at [address] [place] [address]. Will consult oncology as needed. Kidney function stable and platelets. 6. History of right MCA stroke: In 12/2021 treated new issue s/p thrombectomy. Patient has any residual symptoms. Holding antiplatelet medications due to concerns for possible bleed. Will restart as able. Continue home statin. 7. Elevated troponin with CAD: With history of stent placement. EKG without evidence of acute ischemia. Troponin slightly elevated and suspect in the setting of above medical issues. We will continue to cycle. Checking echocardiogram.  Cardiology consult. Holding ASA/Plavix at this juncture. We will continue statin. Blood pressure marginal and holding ARB and beta-blocker as well restart as able. 8. Essential hypertension: Per history, BP marginal and holding home antihypertensive medications in the setting of pericardial effusion. Will restart as able. 9. Chronic diastolic heart failure: EF of 55 to 60% on TTE in 12/2021 with high-grade 2 diastolic dysfunction. BNP elevated has small bilateral effusions. Cardiology consult will provide IV diuresis as needed. 10. History of left breast cancer: S/p mastectomy and on letrozole. Follows with oncology. MEDS to be confirmed with family upon arrival  Disposition:   Current Living situation: home  Expected Disposition: TBD, PT/OT to evaluate when appropriate  Estimated D/C: 2-3 days    Diet Diet NPO   DVT Prophylaxis [] Lovenox, []  Heparin, [x] SCDs, [] Ambulation,  [] Eliquis, [] Xarelto   Code Status DNR-CCA   Surrogate Decision Maker/ POA Daughter Yolis Moncada     History from:     patient    History of Present Illness:     Chief Complaint: Pericardial effusion  Tomás Walters is a 80 y.o. female with pmh of acute CVA in 12/2021, HTN, breast cancer, CAD, CHF, multiple myeloma and CKD  who presents with nausea, vomiting, dyspnea x 1 day. Patient is not the best historian. Patient states she had similar episode approximately 1 month ago. Denies abdominal pain or diarrhea. Has had some chills not aware of any fever but was febrile in the emergency room. Minimal chest pain. No headache, blurred vision, or dizziness. Patient on diuretic and urinates frequently due to this. Hx of recent stroke at [facility]. Multiple myeloma not currently in remission and follows with oncology in [facility]. Review of Systems: Need 10 Elements   Review of Systems   Constitutional: Positive for chills and fever. HENT: Negative. Respiratory: Positive for chest tightness and shortness of breath. Cardiovascular: Negative. Gastrointestinal: Positive for nausea and vomiting. Negative for abdominal distention, abdominal pain and diarrhea. Endocrine: Negative. Genitourinary: Positive for frequency. Negative for dysuria and hematuria. Musculoskeletal: Negative. Skin: Negative. Allergic/Immunologic: Negative. Neurological: Negative. Hematological: Negative. Psychiatric/Behavioral: Negative. Objective:   No intake or output data in the 24 hours ending 03/07/22 0750   Vitals:   Vitals:    03/07/22 0604 03/07/22 0617 03/07/22 0632 03/07/22 0647   BP:   (!) 105/58    Pulse: 76 77 74 75   Resp: 21 24 18 23   Temp:       TempSrc:       SpO2: 97% 96% 96% 97%   Weight:       Height:           Medications Prior to Admission     Prior to Admission medications    Medication Sig Start Date End Date Taking?  Authorizing Provider   traZODone (DESYREL) 50 MG tablet Take 50 mg by mouth nightly   Yes Historical Provider, MD   omeprazole (PRILOSEC) 20 MG delayed release capsule Take 20 mg by mouth daily   Yes Historical Provider, MD   amLODIPine (NORVASC) 5 MG tablet Take 5 mg by mouth daily   Yes Historical Provider, MD   mirtazapine (REMERON) 15 MG tablet Take 15 mg by mouth nightly   Yes Historical Provider, MD   letrozole (FEMARA) 2.5 MG tablet Take 2.5 mg by mouth daily   Yes Historical Provider, MD   cyclobenzaprine (FLEXERIL) 5 MG tablet Take 5 mg by mouth as needed for Muscle spasms   Yes Historical Provider, MD   colchicine (COLCRYS) 0.6 MG tablet Take 0.6 mg by mouth as needed for Pain   Yes Historical Provider, MD   anastrozole (ARIMIDEX) 1 MG tablet Take 1 mg by mouth daily   Yes Historical Provider, MD   potassium chloride (KLOR-CON) 20 MEQ packet Take 20 mEq by mouth daily   Yes Historical Provider, MD   clopidogrel (PLAVIX) 75 MG tablet Take 75 mg by mouth daily   Yes Historical Provider, MD   pantoprazole sodium (PROTONIX) 40 MG PACK packet Take 40 mg by mouth every morning (before breakfast)   Yes Historical Provider, MD   losartan (COZAAR) 25 MG tablet Take 25 mg by mouth daily   Yes Historical Provider, MD   furosemide (LASIX) 20 MG tablet Take 20 mg by mouth daily   Yes Historical Provider, MD   atorvastatin (LIPITOR) 40 MG tablet Take 40 mg by mouth daily   Yes Historical Provider, MD       Physical Exam: Need 8 Elements   Physical Exam     General: NAD, AAOx 4  Eyes: EOMI  ENT: neck supple  Cardiovascular: RRR, no murmurs. Device in left chest  Respiratory: Clear to auscultation BL, on NC  Gastrointestinal: Soft, non tender, ND, +BS  Genitourinary: no suprapubic tenderness  Musculoskeletal: No edema  Skin: warm, dry  Neuro: Alert. No focal deficits  Psych: Mood appropriate.        Past Medical History:   PMHx   Past Medical History:   Diagnosis Date    [facility]     Cerebrovascular accident (CVA) due to vascular occlusion ([address]     CKD (chronic kidney disease)     Coronary artery disease involving native coronary artery without angina pectoris     Hypertension     Multiple myeloma not having achieved remission ([address]      PSHX:  has a past surgical history that includes thrombectomy and Coronary angioplasty with stent. Allergies: No Known Allergies  Fam HX: Denies any family hx of heart disease, stroke, diabetes  Soc HX:   Social History     Socioeconomic History    Marital status: Single     Spouse name: Not on file    Number of children: Not on file    Years of education: Not on file    Highest education level: Not on file   Occupational History    Not on file   Tobacco Use    Smoking status: Never Smoker    Smokeless tobacco: Not on file   Substance and Sexual Activity    Alcohol use: Yes     Comment: once a month    Drug use: Never    Sexual activity: Not on file   Other Topics Concern    Not on file   Social History Narrative    Not on file     Social Determinants of Health     Financial Resource Strain:     Difficulty of Paying Living Expenses: Not on file   Food Insecurity:     Worried About [address] in the Last Year: Not on file    [place] of Food in the Last Year: Not on file   Transportation Needs:     Lack of Transportation (Medical): Not on file    Lack of Transportation (Non-Medical):  Not on file   Physical Activity:     Days of Exercise per Week: Not on file    Minutes of Exercise per Session: Not on file   Stress:     Feeling of Stress : Not on file   Social Connections:     Frequency of Communication with Friends and Family: Not on file    Frequency of Social Gatherings with Friends and Family: Not on file    Attends Confucianism Services: Not on file    Active Member of Clubs or Organizations: Not on file    Attends Club or Organization Meetings: Not on file    Marital Status: Not on file   Intimate Partner Violence:     Fear of Current or Ex-Partner: Not on file    Emotionally Abused: Not on file    Physically Abused: Not on file    Sexually Abused: Not on file   Housing Stability:     Unable to Pay for Housing in the Last Year: Not on file    Number of [place] in the Last Year: Not on file    Unstable Housing in the Last Year: Not on file       Medications:   Medications:    sodium chloride flush  5-40 mL IntraVENous 2 times per day    [START ON 3/8/2022] cefTRIAXone (ROCEPHIN) IV  1,000 mg IntraVENous Q24H      Infusions:    sodium chloride      sodium chloride       PRN Meds: sodium chloride flush, 5-40 mL, PRN  sodium chloride, 25 mL, PRN  acetaminophen, 650 mg, Q6H PRN   Or  acetaminophen, 650 mg, Q6H PRN  polyethylene glycol, 17 g, Daily PRN  promethazine, 12.5 mg, Q6H PRN        Labs      CBC:   Recent Labs     03/06/22  2350   WBC 15.9*   HGB 8.6*        BMP:    Recent Labs     03/06/22  2350      K 4.3      CO2 19*   BUN 20   CREATININE 1.5*   GLUCOSE 129*     Hepatic:   Recent Labs     03/06/22  2350   AST 20   ALT 10   BILITOT 0.6   ALKPHOS 122     Lipids: No results found for: CHOL, HDL, TRIG  Hemoglobin A1C: No results found for: LABA1C  TSH: No results found for: TSH  Troponin:   Lab Results   Component Value Date    TROPONINT 0.034 03/06/2022     Lactic Acid: No results for input(s): LACTA in the last 72 hours.   BNP:   Recent Labs     03/06/22  2350   PROBNP 9,912*     UA:  Lab Results   Component Value Date    NITRU POSITIVE 03/07/2022    COLORU YELLOW 03/07/2022    WBCUA 1135 03/07/2022    RBCUA 56 03/07/2022    MUCUS RARE 03/07/2022    BACTERIA NEGATIVE 03/07/2022    CLARITYU CLOUDY 03/07/2022    SPECGRAV 1.020 03/07/2022    LEUKOCYTESUR MODERATE 03/07/2022    UROBILINOGEN 0.2 03/07/2022    BILIRUBINUR NEGATIVE 03/07/2022    BLOODU LARGE AMOUNT/NUMBER OF 03/07/2022    KETUA NEGATIVE 03/07/2022     Urine Cultures: No results found for: LABURIN  Blood Cultures: No results found for: BC  No results found for: BLOODCULT2  Organism: No results found for: ORG    Imaging/Diagnostics Last 24 Hours   CT ABDOMEN PELVIS WO CONTRAST Additional Contrast? None    Result Date: 3/7/2022  EXAMINATION: CT OF THE ABDOMEN AND PELVIS WITHOUT CONTRAST 3/7/2022 1:25 am TECHNIQUE: CT of the abdomen and pelvis was performed without the administration of intravenous contrast. Multiplanar reformatted images are provided for review. Dose modulation, iterative reconstruction, and/or weight based adjustment of the mA/kV was utilized to reduce the radiation dose to as low as reasonably achievable. COMPARISON: None HISTORY: ORDERING SYSTEM PROVIDED HISTORY: pain TECHNOLOGIST PROVIDED HISTORY: Reason for exam:->pain Additional Contrast?->None Reason for Exam: pain FINDINGS: Lower Chest: Cardiomegaly is noted. Intracardiac leads are seen. Pericardial effusion is noted. Small bilateral pleural effusions are seen. Organs: The gallbladder, spleen, adrenal glands, pancreas, and kidneys are grossly unremarkable. There is a hypodense lesion within the left hepatic lobe which measures 1.3 x 1.4 cm. This could represent a hepatic cyst. GI/Bowel: There is no evidence of bowel obstruction. Colonic diverticulosis is seen. Pelvis: Bladder is unremarkable. Calcified fibroid uterus is noted. There is no free fluid. Peritoneum/Retroperitoneum: There is no free air or lymphadenopathy. Diffuse atherosclerotic vascular calcifications of the aorta are noted. Bones/Soft Tissues: No destructive osseous lesions are seen. A right hip arthroplasty is noted. Post treatment changes are seen to the L1 vertebral body. 1. No acute findings within the abdomen and pelvis. 2. Cardiomegaly with pericardial effusion and small bilateral pleural effusions. 3. Colonic diverticulosis. 4. Calcified fibroid uterus. CT CHEST WO CONTRAST    Result Date: 3/7/2022  EXAMINATION: CT OF THE CHEST WITHOUT CONTRAST 3/7/2022 3:09 am TECHNIQUE: CT of the chest was performed without the administration of intravenous contrast. Multiplanar reformatted images are provided for review. Dose modulation, iterative reconstruction, and/or weight based adjustment of the mA/kV was utilized to reduce the radiation dose to as low as reasonably achievable. COMPARISON: None. HISTORY: ORDERING SYSTEM PROVIDED HISTORY: effusion TECHNOLOGIST PROVIDED HISTORY: Reason for exam:->effusion Reason for Exam: effusion FINDINGS: Mediastinum: Cardiomegaly is noted. Intracardiac leads are seen. Severe coronary artery calcifications are noted. There is a mild-to-moderate sized pericardial effusion with increased density, concerning for hemopericardium. There is no definite evidence to suggest cardiac tamponade at this time. Lungs/pleura: Small bilateral pleural effusions are seen. Diffuse septal thickening is seen which could be related to pulmonary edema. There is no evidence of a pneumothorax. Motion artifact limits evaluation of the lung parenchyma. Upper Abdomen: The visualized upper abdomen is grossly unremarkable. Soft Tissues/Bones: No destructive osseous lesions are identified. 1. Cardiomegaly is seen. There is a mild-to-moderate sized pericardial effusion with increased density of the fluid, concerning for hemopericardium. No definite evidence to suggest cardiac tamponade at this time. Correlate with echocardiogram. 2. Small bilateral pleural effusions with findings of pulmonary edema. Findings were discussed with Vera Wyman at 3:34 am on 3/7/2022. XR CHEST PORTABLE    Result Date: 3/7/2022  EXAMINATION: ONE XRAY VIEW OF THE CHEST 3/7/2022 12:26 am COMPARISON: None. HISTORY: ORDERING SYSTEM PROVIDED HISTORY: chest pain TECHNOLOGIST PROVIDED HISTORY: Reason for exam:->chest pain Reason for Exam: nausea, vomiting, chest pain Additional signs and symptoms: chest pain, nausea, emesis FINDINGS: Cardiomegaly. Implanted cardiac device. Atherosclerosis seen in the aortic knob. Diffuse osteopenia. Degenerative changes seen in the left shoulder and spine. Multilevel degenerative changes seen throughout the thoracic spine. Mild dextroscoliotic deformity is noted. COPD. Trace right pleural effusion versus pleuroparenchymal scarring.      Mild interstitial prominence is identified though favored to be chronic in nature. Focal opacification at the right costophrenic angle may represent a trace pleural effusion, airspace disease or focal pleuroparenchymal scarring. Cardiomegaly. Degenerative changes in the left shoulder and spine, with a dextroscoliotic deformity.        Personally reviewed Lab Studies, Imaging, and discussed case with ER physician and night hospitalist    Electronically signed by Lisandra Figueroa MD on 3/7/2022 at 7:50 AM

## 2022-03-07 NOTE — ED NOTES
Attempted to call report spoke with charge RN at this time they are not able to accept report, they will return call when the assignments are ready.        Tom Osorio RN  03/07/22 9537

## 2022-03-07 NOTE — ED PROVIDER NOTES
This is an EKG note, I did not see or evaluate this patient. Patient was independently seen by midlevel. EKG was independently interpreted, without cardiology present. Paced rhythm, rate 89, , QTc 503, no acute ST elevation, prolonged QTC most likely secondary to paced rhythm. No previous EKG to compare to.      [id number] [facility]  03/07/22 6432

## 2022-03-07 NOTE — ED NOTES
PT placed on oxygent @ 3 lpm with n/c d/t decreased Sa02, pt states that she has never needed oxygen. Provider updated.       Lindsey Dudley RN  03/06/22 9233

## 2022-03-07 NOTE — ED NOTES
Pt appears to be asleep, pt absent of acute cardiorespiratory/neurological distress @ this time.       Candice Mcneill RN  03/07/22 7075

## 2022-03-08 LAB
ALBUMIN SERPL-MCNC: 3.2 GM/DL (ref 3.4–5)
ALP BLD-CCNC: 94 IU/L (ref 40–128)
ALT SERPL-CCNC: 10 U/L (ref 10–40)
ANION GAP SERPL CALCULATED.3IONS-SCNC: 14 MMOL/L (ref 4–16)
AST SERPL-CCNC: 17 IU/L (ref 15–37)
BASOPHILS ABSOLUTE: 0 K/CU MM
BASOPHILS RELATIVE PERCENT: 0.2 % (ref 0–1)
BILIRUB SERPL-MCNC: 0.4 MG/DL (ref 0–1)
BUN BLDV-MCNC: 21 MG/DL (ref 6–23)
CALCIUM SERPL-MCNC: 8.2 MG/DL (ref 8.3–10.6)
CHLORIDE BLD-SCNC: 111 MMOL/L (ref 99–110)
CO2: 15 MMOL/L (ref 21–32)
CREAT SERPL-MCNC: 1.3 MG/DL (ref 0.6–1.1)
DIFFERENTIAL TYPE: ABNORMAL
EOSINOPHILS ABSOLUTE: 0.2 K/CU MM
EOSINOPHILS RELATIVE PERCENT: 1.3 % (ref 0–3)
GFR AFRICAN AMERICAN: 47 ML/MIN/1.73M2
GFR NON-AFRICAN AMERICAN: 39 ML/MIN/1.73M2
GLUCOSE BLD-MCNC: 67 MG/DL (ref 70–99)
HCT VFR BLD CALC: 25.6 % (ref 37–47)
HEMOGLOBIN: 7.1 GM/DL (ref 12.5–16)
IMMATURE NEUTROPHIL %: 0.7 % (ref 0–0.43)
LYMPHOCYTES ABSOLUTE: 1 K/CU MM
LYMPHOCYTES RELATIVE PERCENT: 7.5 % (ref 24–44)
MCH RBC QN AUTO: 26.1 PG (ref 27–31)
MCHC RBC AUTO-ENTMCNC: 27.7 % (ref 32–36)
MCV RBC AUTO: 94.1 FL (ref 78–100)
MONOCYTES ABSOLUTE: 1.6 K/CU MM
MONOCYTES RELATIVE PERCENT: 12.3 % (ref 0–4)
NUCLEATED RBC %: 0 %
PDW BLD-RTO: 16.7 % (ref 11.7–14.9)
PLATELET # BLD: 172 K/CU MM (ref 140–440)
PMV BLD AUTO: 9.5 FL (ref 7.5–11.1)
POTASSIUM SERPL-SCNC: 3.9 MMOL/L (ref 3.5–5.1)
RBC # BLD: 2.72 M/CU MM (ref 4.2–5.4)
SEGMENTED NEUTROPHILS ABSOLUTE COUNT: 10.2 K/CU MM
SEGMENTED NEUTROPHILS RELATIVE PERCENT: 78 % (ref 36–66)
SODIUM BLD-SCNC: 140 MMOL/L (ref 135–145)
TOTAL IMMATURE NEUTOROPHIL: 0.09 K/CU MM
TOTAL NUCLEATED RBC: 0 K/CU MM
TOTAL PROTEIN: 5.2 GM/DL (ref 6.4–8.2)
WBC # BLD: 13.1 K/CU MM (ref 4–10.5)

## 2022-03-08 PROCEDURE — 2580000003 HC RX 258: Performed by: FAMILY MEDICINE

## 2022-03-08 PROCEDURE — 80053 COMPREHEN METABOLIC PANEL: CPT

## 2022-03-08 PROCEDURE — 94761 N-INVAS EAR/PLS OXIMETRY MLT: CPT

## 2022-03-08 PROCEDURE — 36415 COLL VENOUS BLD VENIPUNCTURE: CPT

## 2022-03-08 PROCEDURE — 85025 COMPLETE CBC W/AUTO DIFF WBC: CPT

## 2022-03-08 PROCEDURE — 6370000000 HC RX 637 (ALT 250 FOR IP): Performed by: FAMILY MEDICINE

## 2022-03-08 PROCEDURE — 99232 SBSQ HOSP IP/OBS MODERATE 35: CPT | Performed by: INTERNAL MEDICINE

## 2022-03-08 PROCEDURE — 6360000002 HC RX W HCPCS: Performed by: FAMILY MEDICINE

## 2022-03-08 PROCEDURE — 1200000000 HC SEMI PRIVATE

## 2022-03-08 RX ADMIN — CEFTRIAXONE SODIUM 1000 MG: 1 INJECTION, POWDER, FOR SOLUTION INTRAMUSCULAR; INTRAVENOUS at 02:02

## 2022-03-08 RX ADMIN — LETROZOLE 2.5 MG: 2.5 TABLET ORAL at 08:38

## 2022-03-08 RX ADMIN — SODIUM CHLORIDE, PRESERVATIVE FREE 10 ML: 5 INJECTION INTRAVENOUS at 08:42

## 2022-03-08 RX ADMIN — SODIUM CHLORIDE, PRESERVATIVE FREE 10 ML: 5 INJECTION INTRAVENOUS at 20:19

## 2022-03-08 RX ADMIN — PANTOPRAZOLE SODIUM 40 MG: 40 TABLET, DELAYED RELEASE ORAL at 06:20

## 2022-03-08 RX ADMIN — ATORVASTATIN CALCIUM 40 MG: 40 TABLET, FILM COATED ORAL at 20:19

## 2022-03-08 RX ADMIN — MIRTAZAPINE 15 MG: 15 TABLET, FILM COATED ORAL at 20:19

## 2022-03-08 RX ADMIN — ACETAMINOPHEN 650 MG: 325 TABLET ORAL at 11:23

## 2022-03-08 RX ADMIN — ACETAMINOPHEN 650 MG: 325 TABLET ORAL at 18:00

## 2022-03-08 ASSESSMENT — PAIN SCALES - GENERAL
PAINLEVEL_OUTOF10: 3
PAINLEVEL_OUTOF10: 0
PAINLEVEL_OUTOF10: 0
PAINLEVEL_OUTOF10: 1

## 2022-03-08 NOTE — PLAN OF CARE
Problem: Skin Integrity:  Goal: Will show no infection signs and symptoms  Description: Will show no infection signs and symptoms  3/8/2022 1109 by Geraldine Mendez  Outcome: Ongoing  3/7/2022 2146 by Yunior Ulrich LPN  Outcome: Ongoing     Problem: Skin Integrity:  Goal: Absence of new skin breakdown  Description: Absence of new skin breakdown  3/8/2022 1109 by Geraldine Mendez  Outcome: Ongoing  3/7/2022 2146 by Yunior Ulrich LPN  Outcome: Ongoing     Problem: Falls - Risk of:  Goal: Will remain free from falls  Description: Will remain free from falls  3/8/2022 1109 by Geraldine Mendez  Outcome: Ongoing  3/7/2022 2146 by Yunior Ulrich LPN  Outcome: Ongoing

## 2022-03-08 NOTE — PROGRESS NOTES
Hospitalist Progress Note      Name:  Benjamin Mitchell /Age/Sex: 1934  (80 y.o. female)   MRN & CSN:  [id number] & [id number] Admission Date/Time: 3/6/2022 10:37 PM   Location:  [address] [place] [address] PCP: No primary care provider on file. Hospital Day: 3    Assessment and Plan:   Benjamin Mitchell is a 80 y.o.  female  who presents with Pericardial effusion    1. Pericardial effusion: Moderate size seen on CT chest.  No evidence of tamponade thus far. Possible hemopericardium. Patient cindy any chest pain but has had some dyspnea. Echocardiogram from [facility] on 2021 showed preserved EF with no evidence of effusion at the time. Repeating echocardiogram.  Cardiology and cardiothoracic's to consult. 2. Acute UTI with possible Sepsis: Presented with nausea/vomiting. Difficult to assess symptoms as patient is a poor historian. Febrile in the emergency room. Leukocytosis. UA consistent with infection. Normal lactate, No documented hypoxia. No tachycardia and RR overall stable. Checking urine/blood cultures. Continue IV Rocephin started in the emergency room and will adjust accordingly. 3. Acute anemia: BL hgb ~9 recently. Possible blood loss with potential hemopericardium as above. Patient on ASA/Plavix for history of stroke and CAD. Holding antiplatelet medications at this juncture and will restart as able. Cardiology to consult. No indication for transfusion at this juncture but will monitor and transfuse as needed for hemoglobin less than 7 or symptomatic. 4. CKD stage III: Baseline creatinine around 1.4-1.5 and stable on admission. Will monitor and avoid nephrotoxic medications. Patient is a history of multiple myeloma. 5. Multiple myeloma: Currently not in remission per patient and follows with oncology at [address] [place] [address]. Will consult oncology as needed. Kidney function stable and platelets. 6. History of right MCA stroke: In 2021 treated new issue s/p thrombectomy.   Patient has any residual symptoms. Holding antiplatelet medications due to concerns for possible bleed. Will restart as able. Continue home statin. 7. Elevated troponin with CAD: With history of stent placement. EKG without evidence of acute ischemia. Troponin slightly elevated and suspect in the setting of above medical issues. We will continue to cycle. Checking echocardiogram.  Cardiology consult. Holding ASA/Plavix at this juncture. We will continue statin. Blood pressure marginal and holding ARB and beta-blocker as well restart as able. 8. Essential hypertension: Per history, BP marginal and holding home antihypertensive medications in the setting of pericardial effusion. Will restart as able. 9. Chronic diastolic heart failure: EF of 55 to 60% on TTE in 12/2021 with high-grade 2 diastolic dysfunction. BNP elevated has small bilateral effusions. Cardiology consult will provide IV diuresis as needed. 10. History of left breast cancer: S/p mastectomy and on letrozole. Follows with oncology.       Diet ADULT DIET; Regular; Low Fat/Low Chol/High Fiber/2 gm Na   DVT Prophylaxis [] Lovenox, []  Heparin, [] SCDs, [] Ambulation   GI Prophylaxis [] PPI,  [] H2 Blocker,  [] Carafate,  [] Diet/Tube Feeds   Code Status DNR-CCA   Disposition Patient requires continued admission due to Pericardial effusion   MDM [] Low, [] Moderate,[]  High       History of Present Illness:     Chief Complaint: Pericardial effusion  Blossom Grullon is a 80 y.o.  female  who presents with the above     Subjective : no new event overnight. Patient seen and evaluated in the room. She is observed laying in bed. She denies any shortness of breath,no chest pain    Objective:   No intake or output data in the 24 hours ending 03/08/22 1747   Vitals:   Vitals:    03/08/22 1330   BP: (!) 105/59   Pulse: 83   Resp: 18   Temp: 98 °F (36.7 °C)   SpO2: 96%     Physical Exam:   GEN Awake female, sitting upright in bed in no apparent distress.  Appears given age.  Higinio Chew are equally round. No scleral erythema, discharge, or conjunctivitis. HENT Mucous membranes are moist. Oral pharynx without exudates, no evidence of thrush. NECK Supple, no apparent thyromegaly or masses. RESP Clear to auscultation, no wheezes, rales or rhonchi. Symmetric chest movement while on room air. CARDIO/VASC S1/S2 auscultated. Regular rate without appreciable murmurs, rubs, or gallops. No JVD or carotid bruits. Peripheral pulses equal bilaterally and palpable. No peripheral edema. GI Abdomen is soft without significant tenderness, masses, or guarding. Bowel sounds are normoactive. Rectal exam deferred.  No costovertebral angle tenderness. Normal appearing external genitalia. Stovall catheter is not present. HEME/LYMPH No palpable cervical lymphadenopathy and no hepatosplenomegaly. No petechiae or ecchymoses. MSK No gross joint deformities. SKIN Normal coloration, warm, dry. NEURO Cranial nerves appear grossly intact, normal speech, no lateralizing weakness. PSYCH Awake, alert, oriented x 4. Affect appropriate. Medications:   Medications:    sodium chloride flush  5-40 mL IntraVENous 2 times per day    cefTRIAXone (ROCEPHIN) IV  1,000 mg IntraVENous Q24H    atorvastatin  40 mg Oral Nightly    letrozole  2.5 mg Oral Daily    mirtazapine  15 mg Oral Nightly    pantoprazole  40 mg Oral QAM AC      Infusions:    sodium chloride       PRN Meds: sodium chloride flush, 5-40 mL, PRN  sodium chloride, 25 mL, PRN  acetaminophen, 650 mg, Q6H PRN   Or  acetaminophen, 650 mg, Q6H PRN  polyethylene glycol, 17 g, Daily PRN  promethazine, 12.5 mg, Q6H PRN  traZODone, 50 mg, Nightly PRN          Patient is still admitted because Nausea and Vomiting. The anticipated discharge is in greater than 24 hours.      Electronically signed by Rick Barr DO on 3/8/2022 at 5:47 PM

## 2022-03-09 VITALS
OXYGEN SATURATION: 96 % | BODY MASS INDEX: 21.98 KG/M2 | HEART RATE: 103 BPM | HEIGHT: 55 IN | SYSTOLIC BLOOD PRESSURE: 145 MMHG | RESPIRATION RATE: 20 BRPM | TEMPERATURE: 98.1 F | WEIGHT: 95 LBS | DIASTOLIC BLOOD PRESSURE: 85 MMHG

## 2022-03-09 LAB
ANION GAP SERPL CALCULATED.3IONS-SCNC: 15 MMOL/L (ref 4–16)
BUN BLDV-MCNC: 24 MG/DL (ref 6–23)
CALCIUM SERPL-MCNC: 8.7 MG/DL (ref 8.3–10.6)
CHLORIDE BLD-SCNC: 109 MMOL/L (ref 99–110)
CO2: 16 MMOL/L (ref 21–32)
CREAT SERPL-MCNC: 1.3 MG/DL (ref 0.6–1.1)
CULTURE: ABNORMAL
EKG ATRIAL RATE: 89 BPM
EKG DIAGNOSIS: NORMAL
EKG Q-T INTERVAL: 414 MS
EKG QRS DURATION: 124 MS
EKG QTC CALCULATION (BAZETT): 503 MS
EKG R AXIS: -74 DEGREES
EKG T AXIS: 104 DEGREES
EKG VENTRICULAR RATE: 89 BPM
GFR AFRICAN AMERICAN: 47 ML/MIN/1.73M2
GFR NON-AFRICAN AMERICAN: 39 ML/MIN/1.73M2
GLUCOSE BLD-MCNC: 111 MG/DL (ref 70–99)
Lab: ABNORMAL
POTASSIUM SERPL-SCNC: 3.5 MMOL/L (ref 3.5–5.1)
SODIUM BLD-SCNC: 140 MMOL/L (ref 135–145)
SPECIMEN: ABNORMAL

## 2022-03-09 PROCEDURE — 80048 BASIC METABOLIC PNL TOTAL CA: CPT

## 2022-03-09 PROCEDURE — 6370000000 HC RX 637 (ALT 250 FOR IP): Performed by: FAMILY MEDICINE

## 2022-03-09 PROCEDURE — 36415 COLL VENOUS BLD VENIPUNCTURE: CPT

## 2022-03-09 PROCEDURE — 2700000000 HC OXYGEN THERAPY PER DAY

## 2022-03-09 PROCEDURE — 6360000002 HC RX W HCPCS: Performed by: FAMILY MEDICINE

## 2022-03-09 PROCEDURE — 2580000003 HC RX 258: Performed by: FAMILY MEDICINE

## 2022-03-09 PROCEDURE — 94761 N-INVAS EAR/PLS OXIMETRY MLT: CPT

## 2022-03-09 PROCEDURE — 93010 ELECTROCARDIOGRAM REPORT: CPT | Performed by: INTERNAL MEDICINE

## 2022-03-09 PROCEDURE — 94618 PULMONARY STRESS TESTING: CPT

## 2022-03-09 RX ORDER — AMOXICILLIN AND CLAVULANATE POTASSIUM 875; 125 MG/1; MG/1
1 TABLET, FILM COATED ORAL 2 TIMES DAILY
Qty: 20 TABLET | Refills: 0 | Status: SHIPPED | OUTPATIENT
Start: 2022-03-09 | End: 2022-03-19

## 2022-03-09 RX ORDER — CIPROFLOXACIN 500 MG/1
500 TABLET, FILM COATED ORAL 2 TIMES DAILY
Qty: 14 TABLET | Refills: 0 | Status: SHIPPED | OUTPATIENT
Start: 2022-03-09 | End: 2022-03-09 | Stop reason: HOSPADM

## 2022-03-09 RX ADMIN — SODIUM CHLORIDE, PRESERVATIVE FREE 10 ML: 5 INJECTION INTRAVENOUS at 08:45

## 2022-03-09 RX ADMIN — SODIUM CHLORIDE 25 ML: 9 INJECTION, SOLUTION INTRAVENOUS at 02:31

## 2022-03-09 RX ADMIN — SODIUM CHLORIDE, PRESERVATIVE FREE 10 ML: 5 INJECTION INTRAVENOUS at 09:00

## 2022-03-09 RX ADMIN — LETROZOLE 2.5 MG: 2.5 TABLET ORAL at 08:44

## 2022-03-09 RX ADMIN — PANTOPRAZOLE SODIUM 40 MG: 40 TABLET, DELAYED RELEASE ORAL at 05:50

## 2022-03-09 RX ADMIN — TRAZODONE HYDROCHLORIDE 50 MG: 50 TABLET ORAL at 02:28

## 2022-03-09 RX ADMIN — CEFTRIAXONE SODIUM 1000 MG: 1 INJECTION, POWDER, FOR SOLUTION INTRAMUSCULAR; INTRAVENOUS at 02:33

## 2022-03-09 RX ADMIN — ACETAMINOPHEN 650 MG: 325 TABLET ORAL at 15:00

## 2022-03-09 ASSESSMENT — PAIN SCALES - GENERAL
PAINLEVEL_OUTOF10: 9
PAINLEVEL_OUTOF10: 0
PAINLEVEL_OUTOF10: 1

## 2022-03-09 NOTE — PROGRESS NOTES
Pt home O2 paperwork faxed to [facility]. Please do not discharge pt without oxygen. This testing will be good for 48 hours and will have to be repeated if pt has not discharged prior to then. If IGO has not been delivered prior to pt being ready to leave, please call PFT @ [phone] or Respiratory Therapy @ [phone]. Thanks. PT LIVES in [address] [facility] so cannot be serviced by [facility]. Pt paperwork sent to [address] [place] [address] @ Fran Mitchell was alerted of incoming paperwork. Pt can be services by the [facility] location for oxygen. Please call  with any further oxygen needs.

## 2022-03-09 NOTE — PROGRESS NOTES
3/9/2022 1:35 PM  Patient Room #: 3273/1570-W  Patient Name: Greer Paula    (Step 1 Done by RN if possible otherwise call Pulmonary Diagnostics)  1. Place patient on room air at rest for at least 30 minutes. If patient falls below 88% before 30 minutes then you can record the level and stop. Record room air saturation level _88_ %. If patient is at 88% or below, they will qualify for home oxygen and you can stop. If level does not fall below 88%, fill in level above. If indicated continue to Step 2. Signature:_Laurie Rosa, RRT_ Date: _09/09/2022__  (Step 2&3 Done by [facility])  2. Ambulate patient on room air until saturation falls below 89%. Record level of room air saturation with ambulation___ %. Next, place patient back on ___lpm oxygen and ambulate, record level __%. (Note:  this level must show improvement from room air level done with ambulation.)  If patients saturation on room air with ambulation is 88% or below AND patient shows improvement with oxygen during ambulation, they will qualify for home oxygen and you can stop. If patient does not drop below 89%, then patient should have an overnight oximetry trending on room air to see if level falls below 88%. Complete level in Step 3 below. 3. Room air overnight oximetry level 88 % for___  cumulative minutes. If patients room air oxygen level is < 89% for at least 5 cumulative minutes, patient will qualify for home oxygen and you can stop. (Attach Night Trending Report)    Complete order below: Diagnosis:_CHF__  Home oxygen at:  Length of Need: X Lifetime ?  3 Months     _2__lpm or __%   via  [x] nasal cannula  []mask  [] other: Conserving Device         [x]continuous [x]  with activity  [x]  Nocturnal   [x] Portable Tanks []  Concentrator  [] Conserving Device        Therapist Signature: Med Durham RRT_     Date:  _09/09/2022__  Physician Signature:  __Electronically Signed in EMR_    Date:___  Physician Printed Name: Isiah Raymond, __   [facility] # _[id number]__    [x] Patient Qualifies      [] Patient Does NOT qualify

## 2022-03-09 NOTE — PROGRESS NOTES
Today's plan:  Continue present care, case discussed with family and patient , no need for pericardiocentesis      Admit Date:  3/6/2022    Subjective:ok      Chief complaints on admission  Chief Complaint   Patient presents with    Nausea    Nausea & Vomiting     for the last hour    Hip Pain     left sided hip pain started 2 hours ago, took tylenol at home. History of present illness:Luma is a 80 y. o.year old who  presents with had concerns including Nausea, Nausea & Vomiting (for the last hour), and Hip Pain (left sided hip pain started 2 hours ago, took tylenol at home. ). Past medical history:    has a past medical history of Breast cancer ([facility] [address] Cerebrovascular accident (CVA) due to vascular occlusion ([facility] [address] CKD (chronic kidney disease), Coronary artery disease involving native coronary artery without angina pectoris, Hypertension, and Multiple myeloma not having achieved remission ([facility] [address] Past surgical history:   has a past surgical history that includes thrombectomy and Coronary angioplasty with stent. Social History:   reports that she has never smoked. She does not have any smokeless tobacco history on file. She reports current alcohol use. She reports that she does not use drugs. Family history:  family history is not on file. No Known Allergies      Objective:   /75   Pulse 82   Temp 98.1 °F (36.7 °C) (Oral)   Resp 18   Ht 4' 6\" (1.372 m)   Wt 95 lb (43.1 kg)   SpO2 99%   BMI 22.91 kg/m²   No intake or output data in the 24 hours ending 03/08/22 5319        Physical Exam:  Constitutional:  Well developed, Well nourished, No acute distress, Non-toxic appearance. HENT:  Normocephalic, Atraumatic, Bilateral external ears normal, Oropharynx moist, No oral exudates, Nose normal. Neck- Normal range of motion, No tenderness, Supple, No stridor. Eyes:  PERRL, EOMI, Conjunctiva normal, No discharge.    Respiratory:  Normal breath sounds, No respiratory distress, No wheezing, No chest tenderness. ,no use of accessory muscles, diaphragm movement is normal  Cardiovascular: (PMI) apex non displaced,no lifts no thrills, no s3,no s4, Normal heart rate, Normal rhythm, No murmurs, No rubs, No gallops. Carotid arteries pulse and amplitude are normal no bruit, no abdominal bruit noted ( normal abdominal aorta ausculation), femoral arteries pulse and amplitude are normal no bruit, pedal pulses are normal  GI:  Bowel sounds normal, Soft, No tenderness, No masses, No pulsatile masses. : External genitalia appear normal, No masses or lesions. No discharge. No CVA tenderness. Musculoskeletal:  Intact distal pulses, No edema, No tenderness, No cyanosis, No clubbing. Good range of motion in all major joints. No tenderness to palpation or major deformities noted. Back- No tenderness. Integument:  Warm, Dry, No erythema, No rash. Lymphatic:  No lymphadenopathy noted. Neurologic:  Alert & oriented x 3, Normal motor function, Normal sensory function, No focal deficits noted.    Psychiatric:  Affect normal, Judgment normal, Mood normal.     Medications:    sodium chloride flush  5-40 mL IntraVENous 2 times per day    cefTRIAXone (ROCEPHIN) IV  1,000 mg IntraVENous Q24H    atorvastatin  40 mg Oral Nightly    letrozole  2.5 mg Oral Daily    mirtazapine  15 mg Oral Nightly    pantoprazole  40 mg Oral QAM AC      sodium chloride       sodium chloride flush, sodium chloride, acetaminophen **OR** acetaminophen, polyethylene glycol, promethazine, traZODone    Lab Data:  CBC:   Recent Labs     03/06/22 2350 03/08/22  0301   WBC 15.9* 13.1*   HGB 8.6* 7.1*   HCT 30.7* 25.6*   MCV 91.4 94.1    172     BMP:   Recent Labs     03/06/22 2350 03/08/22  0301    140   K 4.3 3.9    111*   CO2 19* 15*   BUN 20 21   CREATININE 1.5* 1.3*     LIVER PROFILE:   Recent Labs     03/06/22 2350 03/08/22  0301   AST 20 17   ALT 10 10   LIPASE 29  --    BILITOT 0.6 0.4   ALKPHOS 122 94 PT/INR:   Recent Labs     03/06/22  2350   PROTIME 18.1*   INR 1.40     APTT:   Recent Labs     03/06/22  2350   APTT 39.4*     BNP:  No results for input(s): BNP in the last 72 hours. TROPONIN: @TROPONINI:3@      Assessment:  80 y. o.year old who is admitted for          Plan:     1. Pericardial effusion\" echo done and reviewed,pericardial effusion is mild,  No tamponade, it must be post PPM insertion and use of plavix, will not recommend pericardiocentesis of pericardial window, ok to continue plavix,   2. H/o CAD AND PCI at outside hospital, continue medical management as above,not on BB  3. HTN:continue norvasc and losartan  4. Elevated creatinine:\" as per medicine, if its acute, will recommend to hold losartan, may use IVF, echo lVEF IS NORMAL  5. ANEMIA;' AS PER MEDICINE  6. H/O CVA: stable  7. Dyslipidemia: continue statins  All labs, medications and tests reviewed, continue all other medications of all above medical condition listed as is.       Tye Colon MD, MD 3/8/2022 10:18 PM

## 2022-03-09 NOTE — PROGRESS NOTES
Doctor Please copy and paste below in your progress note per DME requirements. Patient was seen in hospital for  CHF    . I am prescribing oxygen because the diagnosis and testing requires the patient to have oxygen in the home. Conditions will improve or be benefited by oxygen use. The patient is able to perform good mobility and therefore requires the use of a portable oxygen system for ambulation.

## 2022-03-09 NOTE — PLAN OF CARE
Problem: Skin Integrity:  Goal: Will show no infection signs and symptoms  Description: Will show no infection signs and symptoms  3/9/2022 1136 by Maricarmen Pimentel LPN  Outcome: Ongoing  3/9/2022 0117 by Alejandra Mccray RN  Outcome: Ongoing  Goal: Absence of new skin breakdown  Description: Absence of new skin breakdown  3/9/2022 1136 by Maricarmen Pimentel LPN  Outcome: Ongoing  3/9/2022 0117 by Alejandra Mccray RN  Outcome: Ongoing     Problem: Falls - Risk of:  Goal: Will remain free from falls  Description: Will remain free from falls  3/9/2022 1136 by Maricarmen Pimentel LPN  Outcome: Ongoing  3/9/2022 0117 by Alejandra Mccray RN  Outcome: Ongoing  Goal: Absence of physical injury  Description: Absence of physical injury  3/9/2022 1136 by Maricarmen Pimentel LPN  Outcome: Ongoing  3/9/2022 0117 by Alejandra Mccray RN  Outcome: Ongoing

## 2022-03-09 NOTE — PROGRESS NOTES
Patient off o2 for home o2 eval.  Tele just advised that she dropped to 88.  Placing patient on 2 lpm.

## 2022-03-09 NOTE — DISCHARGE SUMMARY
Discharge Summary    Name:  Albert Green /Age/Sex: 1934  (80 y.o. female)   MRN & CSN:  [id number] & [id number] Admission Date/Time: 3/6/2022 10:37 PM   Attending:  Love Moore DO Discharging Physician: Love Moore Barlow Respiratory Hospital Course:   Albert Green is a 80 y.o.  female  who presents with Pericardial effusion    Patient admitted after presenting with SANCHEZ. She was noted to have pericardial effusion. Cardiology consultation was called,Echo was done which shows mild effusion and cardiology stated no need for pericardiocentesis. Recommend  Conservative management. .   She was noted to have RONY Lasix and ACE-I held and BMP monitored with improved creatinine. She was also noted to have UTI and she was treated with IV ceftriaxone and later transition to oral antibiotics on discharge. The patient expressed appropriate understanding of and agreement with the discharge recommendations, medications, and plan.      Consults this admission:  IP CONSULT TO HOSPITALIST  IP CONSULT TO [address] [place] [address] TO CARDIOLOGY    Discharge Instruction:   Follow up appointments:   Primary care physician:  within 2 weeks    Diet:  regular diet   Activity: activity as tolerated  Disposition: Discharged to:   [x]Home, [][facility], []SNF, []Acute Rehab, []Hospice   Condition on discharge: Stable    Discharge Medications:        Medication List      START taking these medications    ciprofloxacin 500 MG tablet  Commonly known as: CIPRO  Take 1 tablet by mouth 2 times daily for 7 days        CONTINUE taking these medications    amLODIPine 5 MG tablet  Commonly known as: NORVASC     anastrozole 1 MG tablet  Commonly known as: ARIMIDEX     atorvastatin 40 MG tablet  Commonly known as: LIPITOR     clopidogrel 75 MG tablet  Commonly known as: PLAVIX     colchicine 0.6 MG tablet  Commonly known as: COLCRYS     cyclobenzaprine 5 MG tablet  Commonly known as: FLEXERIL     furosemide 20 MG tablet  Commonly known as: LASIX     letrozole 2.5 MG tablet  Commonly known as: FEMARA     pantoprazole sodium 40 MG Pack packet  Commonly known as: PROTONIX     potassium chloride 20 MEQ packet  Commonly known as: KLOR-CON     traZODone 50 MG tablet  Commonly known as: DESYREL        STOP taking these medications    losartan 25 MG tablet  Commonly known as: COZAAR     mirtazapine 15 MG tablet  Commonly known as: REMERON     omeprazole 20 MG delayed release capsule  Commonly known as: PRILOSEC           Where to Get Your Medications      You can get these medications from any pharmacy    Bring a paper prescription for each of these medications  · ciprofloxacin 500 MG tablet         Objective Findings at Discharge:   BP (!) 145/85   Pulse 103   Temp 98.1 °F (36.7 °C)   Resp 20   Ht 4' 6\" (1.372 m)   Wt 95 lb (43.1 kg)   SpO2 96%   BMI 22.91 kg/m²            PHYSICAL EXAM   GEN Awake female, sitting upright in bed in no apparent distress. Appears given age. EYES Pupils are equally round. No scleral erythema, discharge, or conjunctivitis. HENT Mucous membranes are moist. Oral pharynx without exudates, no evidence of thrush. NECK Supple, no apparent thyromegaly or masses. RESP Clear to auscultation, no wheezes, rales or rhonchi. Symmetric chest movement while on room air. CARDIO/VASC S1/S2 auscultated. Regular rate without appreciable murmurs, rubs, or gallops. No JVD or carotid bruits. Peripheral pulses equal bilaterally and palpable. No peripheral edema. GI Abdomen is soft without significant tenderness, masses, or guarding. Bowel sounds are normoactive. Rectal exam deferred.  No costovertebral angle tenderness. Normal appearing external genitalia. Stovall catheter is not present. HEME/LYMPH No palpable cervical lymphadenopathy and no hepatosplenomegaly. No petechiae or ecchymoses. MSK No gross joint deformities. SKIN Normal coloration, warm, dry.   NEURO Cranial nerves appear grossly intact, normal speech, no lateralizing weakness. PSYCH Awake, alert, oriented x 4. Affect appropriate.     BMP/CBC  Recent Labs     03/06/22  2350 03/08/22  0301 03/09/22  0514    140 140   K 4.3 3.9 3.5    111* 109   CO2 19* 15* 16*   BUN 20 21 24*   CREATININE 1.5* 1.3* 1.3*   WBC 15.9* 13.1*  --    HCT 30.7* 25.6*  --     172  --        IMAGING:      Discharge Time of 35 minutes    Electronically signed by Charlotte Earl DO on 3/9/2022 at 4:17 PM

## 2022-03-11 LAB
ABO/RH: NORMAL
ANTIBODY DATA: NORMAL
ANTIBODY SCREEN: POSITIVE
COMPONENT: NORMAL
COMPONENT: NORMAL
CROSSMATCH RESULT: NORMAL
CROSSMATCH RESULT: NORMAL
STATUS: NORMAL
STATUS: NORMAL
TRANSFUSION STATUS: NORMAL
TRANSFUSION STATUS: NORMAL
UNIT DIVISION: 0
UNIT DIVISION: 0
UNIT NUMBER: NORMAL
UNIT NUMBER: NORMAL